# Patient Record
Sex: FEMALE | NOT HISPANIC OR LATINO | URBAN - METROPOLITAN AREA
[De-identification: names, ages, dates, MRNs, and addresses within clinical notes are randomized per-mention and may not be internally consistent; named-entity substitution may affect disease eponyms.]

---

## 2017-04-26 ENCOUNTER — IMPORTED ENCOUNTER (OUTPATIENT)
Dept: URBAN - METROPOLITAN AREA CLINIC 50 | Facility: CLINIC | Age: 72
End: 2017-04-26

## 2017-05-01 ENCOUNTER — IMPORTED ENCOUNTER (OUTPATIENT)
Dept: URBAN - METROPOLITAN AREA CLINIC 50 | Facility: CLINIC | Age: 72
End: 2017-05-01

## 2018-05-09 ENCOUNTER — IMPORTED ENCOUNTER (OUTPATIENT)
Dept: URBAN - METROPOLITAN AREA CLINIC 50 | Facility: CLINIC | Age: 73
End: 2018-05-09

## 2019-05-08 ENCOUNTER — IMPORTED ENCOUNTER (OUTPATIENT)
Dept: URBAN - METROPOLITAN AREA CLINIC 50 | Facility: CLINIC | Age: 74
End: 2019-05-08

## 2019-05-08 NOTE — PATIENT DISCUSSION
"""monitor Progress Note - Zoë Zaman 1931, 82 y o  female MRN: [id number]    Unit/Bed#: [address] [place] [address] Encounter: [id number]    Primary Care Provider: Adriana Reinoso MD   Date and time admitted to hospital: 4/18/2018  2:31 PM        * CVA (cerebral vascular accident) [facility])   Assessment & Plan    · Patient with disturbance in gait and right hand and leg dyskinetic movements on arrival  · CT Head shows acute/subacute stroke in the right parietal lobe, no hemorrhage  · Has history of TIA in January 2018  Treated in [address]  · Etiology uncertain  Possibly embolic  Patient is anticoagulated with Coumadin but according to family often has subtherapeutic INRs  Possibly also ischemic  · Admit to inpatient  · Cannot do CTA head because of low GFR  · echo with bubble and bilateral carotid ultrasounds done and results pending  · Neuro checks, seizure precautions  · holding antihypertensives  · Neuro consult, recs pending  · PTOT and speech/swallow eval        CHADWICK (acute kidney injury) ([facility] [address] )   Assessment & Plan    · Baseline creatinine appears to be approximately 1 3, on admission 2 08  · Likely pre renal  · UA showed small leukocytes  · Improving with IV fluid hydration  · Serial labs to follow        HTN (hypertension)   Assessment & Plan    · Patient on Dyazide and metoprolol  · Blood pressure is stable, holding antihypertensives        Deep vein thrombosis (DVT) of left upper extremity ([facility] [address] )   Assessment & Plan    · Occurred in January 2018  · On Coumadin  INR supratherapeutic on arrival  · Holding Coumadin    · Follow INRs        Acid reflux   Assessment & Plan    · Continue PPI        Pacemaker   Assessment & Plan    · Placed in 2010  · Tele shows paced rhythm  · Cardio consult for Pacer interrogation given acute CVA        Controlled type 2 diabetes mellitus, without long-term current use of insulin ([facility])   Assessment & Plan    · Last hemoglobin A1c 7 4 in February 2018  · insulin sliding scale  · Hold metformin during admission        H/O mitral valve replacement   Assessment & Plan    · Tissue valve              VTE Pharmacologic Prophylaxis:   Pharmacologic: Warfarin (Coumadin)  Mechanical VTE Prophylaxis in Place: Yes    Patient Centered Rounds: I have performed bedside rounds with nursing staff today  Discussions with Specialists or Other Care Team Provider: Yes    Education and Discussions with Family / Patient:Yes    Time Spent for Care: 30 minutes  More than 50% of total time spent on counseling and coordination of care as described above  Current Length of Stay: 1 day(s)    Current Patient Status: Inpatient     Discharge Plan: pending    Code Status: Level 1 - Full Code      Subjective:   Renée Mcleod feels good today  She walked with physical therapy  Still feels weak in her LEs but starting to improve  Still has involuntary movement in her left hand  Denies chest pain, shortness of breath, palpitations, dizziness  Objective:       Vitals:   Temp (24hrs), Av 5 °F (36 9 °C), Min:98 °F (36 7 °C), Max:99 3 °F (37 4 °C)    HR:  [70-84] 73  Resp:  [18-30] 18  BP: (109-173)/(55-84) 139/69  SpO2:  [95 %-98 %] 95 %  There is no height or weight on file to calculate BMI  Input and Output Summary (last 24 hours): Intake/Output Summary (Last 24 hours) at 18 0920  Last data filed at 18 0410   Gross per 24 hour   Intake                0 ml   Output              750 ml   Net             -750 ml       Physical Exam:     Physical Exam   Constitutional: She is oriented to person, place, and time  She appears well-developed  No distress  HENT:   Head: Normocephalic and atraumatic  Cardiovascular: Normal rate, regular rhythm and normal heart sounds  Pulmonary/Chest: Effort normal and breath sounds normal  No respiratory distress  She has no wheezes  She has no rales  Abdominal: Soft  Bowel sounds are normal  She exhibits no distension  There is no tenderness   There is no rebound and no guarding  Musculoskeletal: She exhibits no edema, tenderness or deformity  Left hand with dyskinetic involuntary movement   Neurological: She is alert and oriented to person, place, and time  Skin: Skin is warm and dry  Psychiatric: She has a normal mood and affect  Her behavior is normal    Nursing note and vitals reviewed  Additional Data:     Labs:      Results from last 7 days  Lab Units 04/19/18  0629 04/18/18  1500   WBC Thousand/uL 5 50 5 70   HEMOGLOBIN g/dL 10 4* 11 7*   HEMATOCRIT % 31 6* 35 1*   PLATELETS Thousands/uL 90* 115*   NEUTROS PCT %  --  50   LYMPHS PCT %  --  21   MONOS PCT %  --  9   EOS PCT %  --  18*       Results from last 7 days  Lab Units 04/19/18  0629 04/18/18  1500   SODIUM mmol/L 139 133*   POTASSIUM mmol/L 4 2 5 2   CHLORIDE mmol/L 104 99*   CO2 mmol/L 27 29   BUN mg/dL 50* 49*   CREATININE mg/dL 1 98* 2 08*   CALCIUM mg/dL 9 6 9 7   TOTAL PROTEIN g/dL  --  7 9   BILIRUBIN TOTAL mg/dL  --  0 50   ALK PHOS U/L  --  85   ALT U/L  --  25   AST U/L  --  32   GLUCOSE RANDOM mg/dL 101 95       Results from last 7 days  Lab Units 04/18/18  1500   INR  3 15*       * I Have Reviewed All Lab Data Listed Above  * Additional Pertinent Lab Tests Reviewed: All Labs For Current Hospital Admission Reviewed    Imaging:  Xr Chest 1 View Portable    Result Date: 4/18/2018  Narrative: CHEST INDICATION:   fall  Altered mental status  COMPARISON:  1/11/2018 EXAM PERFORMED/VIEWS:  XR CHEST PORTABLE Images: 1 FINDINGS:  Left-sided chest wall pacemaker is identified  Pacemaker leads are intact  Midline sternotomy wires again noted  Heart shadow is enlarged but unchanged from prior exam  The lungs are clear  No pneumothorax or pleural effusion  Osseous structures appear within normal limits for patient age  Impression: No acute cardiopulmonary disease   Workstation performed: [id number]     Ct Head Without Contrast    Result Date: 4/18/2018  Narrative: CT BRAIN - WITHOUT CONTRAST INDICATION:   fall on coumadin  COMPARISON:  None  TECHNIQUE:  CT examination of the brain was performed  In addition to axial images, coronal 2D reformatted images were created and submitted for interpretation  Radiation dose length product (DLP) for this visit:  434 74 mGy-cm   This examination, like all CT scans performed in the [facility], was performed utilizing techniques to minimize radiation dose exposure, including the use of iterative  reconstruction and automated exposure control  IMAGE QUALITY:  Diagnostic  FINDINGS: PARENCHYMA:  No intracranial mass, mass effect or midline shift  There is no acute hemorrhage  There is no extracerebral hemorrhage  There is a subacute to acute infarct in the posterior right parietal region  Microangiopathic change is present  There is a chronic lacunar type infarct in the left thalamus  VENTRICLES AND EXTRA-AXIAL SPACES:  Prominent consistent with atrophy  VISUALIZED ORBITS AND PARANASAL SINUSES:  Unremarkable  CALVARIUM AND EXTRACRANIAL SOFT TISSUES:  Small mastoid effusion on the left  Impression: 1  Subacute to acute infarct in the posterior right parietal region  No acute hemorrhage  No extracerebral collections  * I personally telephoned this result to Gerri Ortiz on 4/18/2018 3:11 PM  Workstation performed: [id number]     Ct Spine Cervical Without Contrast    Result Date: 4/18/2018  Narrative: CT CERVICAL SPINE - WITHOUT CONTRAST INDICATION:   Fall on Coumadin  COMPARISON: None  TECHNIQUE:  CT examination of the cervical spine was performed without intravenous contrast   Contiguous axial images were obtained  Sagittal and coronal reconstructions were performed  Radiation dose length product (DLP) for this visit:  1578 22 mGy-cm     This examination, like all CT scans performed in the [facility], was performed utilizing techniques to minimize radiation dose exposure, including the use of iterative reconstruction and automated exposure control  IMAGE QUALITY:  Diagnostic  FINDINGS: ALIGNMENT:  Normal alignment of the cervical spine  No subluxation  VERTEBRAL BODIES:  No fracture  Congenital fusion of C2 and C3  DEGENERATIVE CHANGES:  Mild disc space narrowing and spurring at several levels  PREVERTEBRAL AND PARASPINAL SOFT TISSUES:  Unremarkable  THORACIC INLET:  Normal      Impression: No cervical spine fracture or traumatic malalignment  Degenerative changes  Congenital fusion of C2 and C3  Workstation performed: [id number]     Imaging Reports Reviewed by myself    Cultures:   Blood Culture: No results found for: BLOODCX  Urine Culture:   Lab Results   Component Value Date    URINECX <10,000 cfu/ml  02/02/2018    URINECX No Growth <1000 cfu/mL 10/16/2017    URINECX 20,000-29,000 cfu/ml Mixed Contaminants X3 09/26/2017    URINECX No Growth <1000 cfu/mL 01/12/2017     Sputum Culture: No components found for: SPUTUMCX  Wound Culture: No results found for: WOUNDCULT    Last 24 Hours Medication List:     Current Facility-Administered Medications:  aspirin 81 mg Oral Daily Kristi Freedman MD   atorvastatin 80 mg Oral Daily With [employer], DO   insulin lispro 1-5 Units Subcutaneous TID With Meals Kristi Freedman MD   iron polysaccharides 150 mg Oral Daily Kristi Freedman MD   lidocaine  Topical PRN Kristi Freedman MD   pantoprazole 40 mg Oral Early Morning Kristi Freedman MD   torsemide 10 mg Oral Daily Kristi Freedman MD        Today, Patient Was Seen By: Kristi Freedman MD    ** Please Note: Dragon 360 Dictation voice to text software may have been used in the creation of this document   **

## 2019-06-25 ENCOUNTER — IMPORTED ENCOUNTER (OUTPATIENT)
Dept: URBAN - METROPOLITAN AREA CLINIC 50 | Facility: CLINIC | Age: 74
End: 2019-06-25

## 2019-07-23 ENCOUNTER — IMPORTED ENCOUNTER (OUTPATIENT)
Dept: URBAN - METROPOLITAN AREA CLINIC 50 | Facility: CLINIC | Age: 74
End: 2019-07-23

## 2019-08-06 NOTE — PATIENT DISCUSSION
Barron Visual Field 36 point screen: I have reviewed the visual fields both taped and untaped on this patient which demonstrate significant obstruction of the patient's peripheral visual field on both eyes.

## 2019-11-06 NOTE — PATIENT DISCUSSION
Resume normal activity. Resume any medications that were discontinued for surgery. Stop cold compresses start hot compresses to affected lid(s) 2-3 times daily for one month, 5 minutes at a time. Artificial tears prn burning/itching/blurry vision. Wash incisions/ lash line once daily with baby shampoo.

## 2019-11-06 NOTE — PATIENT DISCUSSION
Also, please do not hesitate to call us if you have any concerns not addressed by this information. Please call [phone] and we will do everything we can to help you during this period.

## 2019-11-06 NOTE — PATIENT DISCUSSION
Patient is here for suture removal.  The patient has a normal amount of brusing and swelling consistent with the post operative course. The suture lines are intact, there is no evidence of infection. The plan is to remove the sutures today with an expectation of normal healing. The post op course has been further reviewed with the patient.  Sutures removed today without difficulty

## 2020-06-22 ENCOUNTER — IMPORTED ENCOUNTER (OUTPATIENT)
Dept: URBAN - METROPOLITAN AREA CLINIC 50 | Facility: CLINIC | Age: 75
End: 2020-06-22

## 2021-04-18 ASSESSMENT — VISUAL ACUITY
OD_BAT: 20/30
OD_CC: J1+
OS_SC: 20/25
OD_SC: 20/25
OS_OTHER: 20/40. 20/60.
OD_BAT: 20/30
OD_SC: 20/25
OS_BAT: 20/30
OD_OTHER: 20/40. 20/40.
OD_SC: 20/25
OS_OTHER: 20/30. 20/30.
OD_BAT: 20/40
OS_SC: 20/25-1
OD_CC: J1+
OS_CC: J1+
OD_OTHER: 20/30. 20/70.
OS_SC: 20/25
OD_CC: J1+
OS_CC: J1+
OS_BAT: 20/40
OS_CC: J1+@ 16 IN
OD_OTHER: 20/30. 20/40.
OD_SC: 20/30+
OS_SC: 20/20-
OS_CC: J1+
OD_CC: J1+@ 16 IN
OD_PH: 20/25-

## 2021-04-18 ASSESSMENT — TONOMETRY
OD_IOP_MMHG: 14
OS_IOP_MMHG: 12
OS_IOP_MMHG: 14
OD_IOP_MMHG: 15
OS_IOP_MMHG: 15
OD_IOP_MMHG: 13
OD_IOP_MMHG: 12
OS_IOP_MMHG: 12

## 2021-06-21 ENCOUNTER — PREPPED CHART (OUTPATIENT)
Dept: URBAN - METROPOLITAN AREA CLINIC 49 | Facility: CLINIC | Age: 76
End: 2021-06-21

## 2021-06-23 ENCOUNTER — COMPREHENSIVE EXAM (OUTPATIENT)
Dept: URBAN - METROPOLITAN AREA CLINIC 49 | Facility: CLINIC | Age: 76
End: 2021-06-23

## 2021-06-23 DIAGNOSIS — E11.9: ICD-10-CM

## 2021-06-23 DIAGNOSIS — H26.493: ICD-10-CM

## 2021-06-23 PROCEDURE — 92014 COMPRE OPH EXAM EST PT 1/>: CPT

## 2021-06-23 ASSESSMENT — VISUAL ACUITY
OU_SC: J1+
OD_GLARE: 20/25
OS_GLARE: 20/25
OS_SC: 20/25-1
OD_SC: 20/25-2

## 2021-06-23 ASSESSMENT — TONOMETRY
OD_IOP_MMHG: 10
OS_IOP_MMHG: 10

## 2022-06-27 ENCOUNTER — COMPREHENSIVE EXAM (OUTPATIENT)
Dept: URBAN - METROPOLITAN AREA CLINIC 49 | Facility: CLINIC | Age: 77
End: 2022-06-27

## 2022-06-27 DIAGNOSIS — H26.493: ICD-10-CM

## 2022-06-27 DIAGNOSIS — H43.813: ICD-10-CM

## 2022-06-27 DIAGNOSIS — E11.9: ICD-10-CM

## 2022-06-27 PROCEDURE — 92014 COMPRE OPH EXAM EST PT 1/>: CPT

## 2022-06-27 ASSESSMENT — VISUAL ACUITY
OD_GLARE: 20/30
OU_SC: J1+
OD_SC: 20/25-1
OD_GLARE: 20/25
OS_GLARE: 20/30
OS_GLARE: 20/25
OS_SC: 20/30

## 2022-06-27 ASSESSMENT — TONOMETRY
OS_IOP_MMHG: 11
OD_IOP_MMHG: 12

## 2023-07-03 ENCOUNTER — COMPREHENSIVE EXAM (OUTPATIENT)
Dept: URBAN - METROPOLITAN AREA CLINIC 49 | Facility: CLINIC | Age: 78
End: 2023-07-03

## 2023-07-03 DIAGNOSIS — H43.813: ICD-10-CM

## 2023-07-03 DIAGNOSIS — H26.493: ICD-10-CM

## 2023-07-03 DIAGNOSIS — E11.9: ICD-10-CM

## 2023-07-03 PROCEDURE — 92134 CPTRZ OPH DX IMG PST SGM RTA: CPT

## 2023-07-03 PROCEDURE — 92014 COMPRE OPH EXAM EST PT 1/>: CPT

## 2023-07-03 ASSESSMENT — VISUAL ACUITY
OD_SC: 20/40-1
OS_GLARE: 20/20
OS_SC: 20/25-2
OU_SC: J1+@16"
OU_SC: 20/25-2
OD_GLARE: 20/25
OD_GLARE: 20/25
OS_GLARE: 20/25
OD_PH: 20/25

## 2023-07-03 ASSESSMENT — TONOMETRY
OD_IOP_MMHG: 12
OS_IOP_MMHG: 12

## 2024-07-15 ENCOUNTER — COMPREHENSIVE EXAM (OUTPATIENT)
Dept: URBAN - METROPOLITAN AREA CLINIC 49 | Facility: LOCATION | Age: 79
End: 2024-07-15

## 2024-07-15 DIAGNOSIS — H43.813: ICD-10-CM

## 2024-07-15 DIAGNOSIS — H26.493: ICD-10-CM

## 2024-07-15 DIAGNOSIS — E11.9: ICD-10-CM

## 2024-07-15 PROCEDURE — 92014 COMPRE OPH EXAM EST PT 1/>: CPT

## 2024-07-15 ASSESSMENT — VISUAL ACUITY
OU_SC: J1+@14"
OS_GLARE: 20/20
OD_GLARE: 20/20
OS_SC: 20/25-2
OS_GLARE: 20/25-1
OD_GLARE: 20/25
OD_SC: 20/25-2

## 2024-07-15 ASSESSMENT — TONOMETRY
OS_IOP_MMHG: 11
OD_IOP_MMHG: 10

## 2025-07-21 ENCOUNTER — COMPREHENSIVE EXAM (OUTPATIENT)
Age: 80
End: 2025-07-21

## 2025-07-21 DIAGNOSIS — H43.813: ICD-10-CM

## 2025-07-21 DIAGNOSIS — E11.9: ICD-10-CM

## 2025-07-21 PROCEDURE — 99214 OFFICE O/P EST MOD 30 MIN: CPT
